# Patient Record
Sex: FEMALE | URBAN - METROPOLITAN AREA
[De-identification: names, ages, dates, MRNs, and addresses within clinical notes are randomized per-mention and may not be internally consistent; named-entity substitution may affect disease eponyms.]

---

## 2018-03-15 ENCOUNTER — HISTORICAL (OUTPATIENT)
Dept: RADIOLOGY | Facility: HOSPITAL | Age: 53
End: 2018-03-15

## 2018-04-30 ENCOUNTER — HISTORICAL (OUTPATIENT)
Dept: ENDOSCOPY | Facility: HOSPITAL | Age: 53
End: 2018-04-30

## 2018-04-30 LAB
ALBUMIN SERPL-MCNC: 3.2 GM/DL (ref 3.4–5)
ALBUMIN/GLOB SERPL: 1 RATIO (ref 1.1–2)
ALP SERPL-CCNC: 57 UNIT/L (ref 38–126)
ALT SERPL-CCNC: 16 UNIT/L (ref 12–78)
AST SERPL-CCNC: 13 UNIT/L (ref 15–37)
BILIRUB SERPL-MCNC: 0.7 MG/DL (ref 0.2–1)
BILIRUBIN DIRECT+TOT PNL SERPL-MCNC: 0.1 MG/DL (ref 0–0.5)
BILIRUBIN DIRECT+TOT PNL SERPL-MCNC: 0.6 MG/DL (ref 0–0.8)
BUN SERPL-MCNC: 9 MG/DL (ref 7–18)
CALCIUM SERPL-MCNC: 8.8 MG/DL (ref 8.5–10.1)
CHLORIDE SERPL-SCNC: 106 MMOL/L (ref 98–107)
CO2 SERPL-SCNC: 26 MMOL/L (ref 21–32)
CREAT SERPL-MCNC: 0.65 MG/DL (ref 0.55–1.02)
GLOBULIN SER-MCNC: 3.2 GM/DL (ref 2.4–3.5)
GLUCOSE SERPL-MCNC: 93 MG/DL (ref 74–106)
POTASSIUM SERPL-SCNC: 3.2 MMOL/L (ref 3.5–5.1)
PROT SERPL-MCNC: 6.4 GM/DL (ref 6.4–8.2)
SODIUM SERPL-SCNC: 140 MMOL/L (ref 136–145)

## 2021-11-05 ENCOUNTER — HISTORICAL (OUTPATIENT)
Dept: RADIOLOGY | Facility: HOSPITAL | Age: 56
End: 2021-11-05

## 2021-12-10 ENCOUNTER — HISTORICAL (OUTPATIENT)
Dept: ENDOSCOPY | Facility: HOSPITAL | Age: 56
End: 2021-12-10

## 2022-04-30 NOTE — OP NOTE
Patient:   Mary Philip             MRN: [id number]            FIN: [id number]               Age:   52 years     Sex:  Female     :  1965   Associated Diagnoses:   None   Author:   Blayne Gonzalez MD      Operative Note   Operative Information   Date/ Time:  2018 10:17:00.     Procedures Performed: Colonoscopy with snare polypectomy.     Preoperative Diagnosis: Abdominal pain, history of constipation.     Postoperative Diagnosis: 1.  Transverse and left colon polyps.  Snare polypectomy/biopsy was done  2.  Internal hemorrhoids, grade 1-2.  Otherwise normal exam.     Surgeon: Blayne Gonzalez MD.     Assistant: GI staff.     Anesthesia: per anaesthesia service.     Speciman Removed: Yes.     Esimated blood loss: loss  3  cc.     Description of Procedure/Findings/    Complications: History and physical as per pre-operative note. Informed consent was obtained. Patient was placed in left lateral position. Sedation per anaesthesia service. Rectal exam was unremarkable. Olympus video colonoscope was introduced into the rectum and was carefully advanced to the cecum. The quality of the preparation was satisfactory. Patient tolerated the procedure well without any complications..     Findings: There was a 3-4 mm polyp noted in transverse colon.  Cold snare polypectomy was done.  There was a small 2-3 mm polyp noted in sigmoid colon.  This was biopsied.  There was another 3-4 mm polyp noted in sigmoid colon.  Cold snare polypectomy was done.  There was a small 3-4 mm polyp noted in rectum.  Cold snare polypectomy was done.  There were grade 1 to 2 internal hemorrhoids noted on withdrawal of the scope.  Cecum and ascending colon appeared unremarkable.  Terminal ileum was intubated during procedure and appeared to be unremarkable.  Estimated withdrawal time from cecum to rectum was approximately 18 minutes..     Complications: None.     Recommendations:  1.  Follow biopsy results.  2.  Repeat colonoscopy in  3-5 years pending biopsy results.    c.c.: Dr. Joe Win .

## 2022-04-30 NOTE — OP NOTE
Patient:   Mary Philip             MRN: [id number]            FIN: [id number]               Age:   56 years     Sex:  Female     :  1965   Associated Diagnoses:   None   Author:   Blayne Gonzalez MD      Operative Note   Operative Information   Date/ Time:  12/10/2021 09:06:00.     Procedures Performed: EGD with biopsy .     Preoperative Diagnosis: GERD, bloating..     Postoperative Diagnosis: 1.  Small gastric polyps, fundus and body, 2-4 mm (benign-appearing).  Biopsies were obtained.  2.  Antral biopsies were obtained.  Otherwise normal exam.     Surgeon: Blayne Gonzalez MD.     Assistant: GI staff.     Anesthesia: per anaesthesia service.     Speciman Removed: Yes.     Esimated blood loss: loss  3  cc.     Description of Procedure/Findings/    Complications: History and physical as per pre-operative note. Informed consent was obtained. Patient was placed in left lateral position. Sedation per anaesthesia service. Olympus video gastroscope was introduced into the oral cavity and esophagus was intubated under direct visualization. The scope was carefully advanced to the distal duodenum. The patient tolerated the procedure well without any complications. .     Findings: Esophagus: Normal mucosa.  GE junction at approximately 40 cm.  Stomach: There were small benign-appearing polyps ranging in size from 2-4 mm noted in fundus and body of the stomach.  Biopsies were obtained.  Antral biopsies were obtained as well.  No other significant abnormality noted.  Duodenum: Duodenal bulb, 2nd and 3rd portion of the duodenum appeared unremarkable to the extent of exam..     Complications: None.     Recommendations:  1.  Follow biopsy results.  2.  Follow with me as scheduled..

## 2024-03-21 DIAGNOSIS — E04.1 THYROID NODULE: Primary | ICD-10-CM

## 2024-04-04 ENCOUNTER — HOSPITAL ENCOUNTER (OUTPATIENT)
Dept: RADIOLOGY | Facility: HOSPITAL | Age: 59
Discharge: HOME | End: 2024-04-04
Attending: INTERNAL MEDICINE
Payer: COMMERCIAL

## 2024-04-04 DIAGNOSIS — E04.1 THYROID NODULE: ICD-10-CM

## 2024-04-04 PROCEDURE — 76536 US EXAM OF HEAD AND NECK: CPT | Mod: TC
